# Patient Record
Sex: MALE | Race: WHITE | Employment: FULL TIME | URBAN - METROPOLITAN AREA
[De-identification: names, ages, dates, MRNs, and addresses within clinical notes are randomized per-mention and may not be internally consistent; named-entity substitution may affect disease eponyms.]

---

## 2017-01-19 RX ORDER — MOMETASONE 50 UG/1
2 SPRAY, METERED NASAL DAILY
Qty: 3 BOTTLE | Refills: 1 | Status: SHIPPED | OUTPATIENT
Start: 2017-01-19 | End: 2018-05-17

## 2017-03-30 RX ORDER — LORAZEPAM 0.5 MG/1
TABLET ORAL
Qty: 180 TABLET | Refills: 0 | Status: SHIPPED | OUTPATIENT
Start: 2017-03-30 | End: 2017-08-01

## 2017-05-15 ENCOUNTER — OFFICE VISIT (OUTPATIENT)
Dept: INTERNAL MEDICINE CLINIC | Facility: CLINIC | Age: 57
End: 2017-05-15

## 2017-05-15 VITALS
DIASTOLIC BLOOD PRESSURE: 80 MMHG | SYSTOLIC BLOOD PRESSURE: 122 MMHG | BODY MASS INDEX: 29 KG/M2 | RESPIRATION RATE: 16 BRPM | WEIGHT: 199 LBS | OXYGEN SATURATION: 96 % | TEMPERATURE: 98 F | HEART RATE: 79 BPM

## 2017-05-15 DIAGNOSIS — I25.10 CORONARY ARTERY DISEASE INVOLVING NATIVE CORONARY ARTERY OF NATIVE HEART WITHOUT ANGINA PECTORIS: ICD-10-CM

## 2017-05-15 DIAGNOSIS — R73.01 IMPAIRED FASTING GLUCOSE: ICD-10-CM

## 2017-05-15 DIAGNOSIS — M79.672 PAIN OF LEFT HEEL: ICD-10-CM

## 2017-05-15 DIAGNOSIS — E78.00 PURE HYPERCHOLESTEROLEMIA: Primary | ICD-10-CM

## 2017-05-15 PROCEDURE — 99214 OFFICE O/P EST MOD 30 MIN: CPT | Performed by: FAMILY MEDICINE

## 2017-05-15 NOTE — PROGRESS NOTES
HPI:    Patient ID: Yonathan García is a 64year old male. HPI  Yonathan García is a 64year old male.   HPI:   Here for med ck  Overall doing well  Still seeing Dr Krysta Coles for the heart    The palpitations did go away    Taking meds as directed  No issues rashes  RESPIRATORY: denies shortness of breath with exertion  CARDIOVASCULAR: denies chest pain on exertion  GI: denies abdominal pain and denies heartburn  NEURO: denies headaches    EXAM:   /80 mmHg  Pulse 79  Temp(Src) 98.2 °F (36.8 °C) (Oral)  R Allergies:  Erythromycin            Nausea only   PHYSICAL EXAM:   Physical Exam           ASSESSMENT/PLAN:   Pure hypercholesterolemia  (primary encounter diagnosis)  Impaired fasting glucose  Coronary artery disease involving native coronary artery o

## 2017-06-12 RX ORDER — RANITIDINE 150 MG/1
TABLET ORAL
Qty: 180 TABLET | Refills: 1 | Status: SHIPPED | OUTPATIENT
Start: 2017-06-12 | End: 2017-12-10

## 2017-06-12 RX ORDER — METOPROLOL SUCCINATE 50 MG/1
TABLET, EXTENDED RELEASE ORAL
Qty: 90 TABLET | Refills: 1 | Status: SHIPPED | OUTPATIENT
Start: 2017-06-12 | End: 2017-12-10

## 2017-06-28 ENCOUNTER — TELEPHONE (OUTPATIENT)
Dept: INTERNAL MEDICINE CLINIC | Facility: CLINIC | Age: 57
End: 2017-06-28

## 2017-06-28 NOTE — TELEPHONE ENCOUNTER
Patient informed of orders, patient verbalized understanding.  Contact information provided for Dr. Davey Rivas

## 2017-08-01 ENCOUNTER — TELEPHONE (OUTPATIENT)
Dept: INTERNAL MEDICINE CLINIC | Facility: CLINIC | Age: 57
End: 2017-08-01

## 2017-08-01 RX ORDER — PAROXETINE 10 MG/1
TABLET, FILM COATED ORAL
Qty: 90 TABLET | Refills: 1 | Status: SHIPPED | OUTPATIENT
Start: 2017-08-01 | End: 2018-02-12

## 2017-08-01 RX ORDER — LORAZEPAM 0.5 MG/1
TABLET ORAL
Qty: 180 TABLET | Refills: 0 | COMMUNITY
Start: 2017-08-01 | End: 2017-12-27

## 2017-08-21 ENCOUNTER — OFFICE VISIT (OUTPATIENT)
Dept: INTERNAL MEDICINE CLINIC | Facility: CLINIC | Age: 57
End: 2017-08-21

## 2017-08-21 VITALS
OXYGEN SATURATION: 96 % | HEART RATE: 72 BPM | HEIGHT: 70 IN | DIASTOLIC BLOOD PRESSURE: 86 MMHG | BODY MASS INDEX: 29.24 KG/M2 | WEIGHT: 204.25 LBS | TEMPERATURE: 98 F | RESPIRATION RATE: 16 BRPM | SYSTOLIC BLOOD PRESSURE: 138 MMHG

## 2017-08-21 DIAGNOSIS — K13.70 ORAL LESION: Primary | ICD-10-CM

## 2017-08-21 PROCEDURE — 99213 OFFICE O/P EST LOW 20 MIN: CPT | Performed by: FAMILY MEDICINE

## 2017-08-21 NOTE — PROGRESS NOTES
HPI:    Patient ID: Azra Jimenez is a 62year old male.     HPI  5-6 weeks now w Left cheek sore inside     +discomfort    No dental visit recently     Glands may be sore also L side neck    Had thrush 30 yrs ago     No abx lately    No smoker    1 beer a

## 2017-12-13 RX ORDER — RANITIDINE 150 MG/1
TABLET ORAL
Qty: 180 TABLET | Refills: 1 | Status: SHIPPED | OUTPATIENT
Start: 2017-12-13 | End: 2017-12-27

## 2017-12-13 RX ORDER — METOPROLOL SUCCINATE 50 MG/1
TABLET, EXTENDED RELEASE ORAL
Qty: 90 TABLET | Refills: 0 | Status: SHIPPED | OUTPATIENT
Start: 2017-12-13 | End: 2018-03-05

## 2017-12-27 ENCOUNTER — OFFICE VISIT (OUTPATIENT)
Dept: INTERNAL MEDICINE CLINIC | Facility: CLINIC | Age: 57
End: 2017-12-27

## 2017-12-27 VITALS
TEMPERATURE: 98 F | RESPIRATION RATE: 16 BRPM | HEART RATE: 65 BPM | WEIGHT: 197 LBS | OXYGEN SATURATION: 99 % | DIASTOLIC BLOOD PRESSURE: 84 MMHG | SYSTOLIC BLOOD PRESSURE: 110 MMHG | BODY MASS INDEX: 28 KG/M2

## 2017-12-27 DIAGNOSIS — M25.511 ACUTE PAIN OF RIGHT SHOULDER: Primary | ICD-10-CM

## 2017-12-27 PROCEDURE — 99213 OFFICE O/P EST LOW 20 MIN: CPT | Performed by: FAMILY MEDICINE

## 2017-12-27 RX ORDER — LORAZEPAM 0.5 MG/1
TABLET ORAL
Qty: 180 TABLET | Refills: 0 | Status: SHIPPED | OUTPATIENT
Start: 2017-12-27 | End: 2018-08-29

## 2017-12-27 RX ORDER — MELOXICAM 15 MG/1
15 TABLET ORAL DAILY
Qty: 30 TABLET | Refills: 0 | Status: SHIPPED | OUTPATIENT
Start: 2017-12-27 | End: 2018-06-04 | Stop reason: ALTCHOICE

## 2017-12-27 NOTE — PROGRESS NOTES
HPI:    Patient ID: Alexi Howe is a 62year old male.     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

## 2018-01-09 ENCOUNTER — TELEPHONE (OUTPATIENT)
Dept: INTERNAL MEDICINE CLINIC | Facility: CLINIC | Age: 58
End: 2018-01-09

## 2018-01-09 DIAGNOSIS — Z01.818 PRE-OP TESTING: Primary | ICD-10-CM

## 2018-01-11 LAB
ABSOLUTE BASOPHILS: 62 CELLS/UL (ref 0–200)
ABSOLUTE EOSINOPHILS: 347 CELLS/UL (ref 15–500)
ABSOLUTE LYMPHOCYTES: 1786 CELLS/UL (ref 850–3900)
ABSOLUTE MONOCYTES: 570 CELLS/UL (ref 200–950)
ABSOLUTE NEUTROPHILS: 3435 CELLS/UL (ref 1500–7800)
BASOPHILS: 1 %
EOSINOPHILS: 5.6 %
HEMATOCRIT: 47.7 % (ref 38.5–50)
HEMOGLOBIN: 16.3 G/DL (ref 13.2–17.1)
LYMPHOCYTES: 28.8 %
MCH: 30.7 PG (ref 27–33)
MCHC: 34.2 G/DL (ref 32–36)
MCV: 89.8 FL (ref 80–100)
MONOCYTES: 9.2 %
MPV: 9.6 FL (ref 7.5–12.5)
NEUTROPHILS: 55.4 %
PLATELET COUNT: 211 THOUSAND/UL (ref 140–400)
RDW: 11.9 % (ref 11–15)
RED BLOOD CELL COUNT: 5.31 MILLION/UL (ref 4.2–5.8)
WHITE BLOOD CELL COUNT: 6.2 THOUSAND/UL (ref 3.8–10.8)

## 2018-01-15 ENCOUNTER — OFFICE VISIT (OUTPATIENT)
Dept: INTERNAL MEDICINE CLINIC | Facility: CLINIC | Age: 58
End: 2018-01-15

## 2018-01-15 VITALS
RESPIRATION RATE: 16 BRPM | TEMPERATURE: 98 F | WEIGHT: 205 LBS | DIASTOLIC BLOOD PRESSURE: 78 MMHG | OXYGEN SATURATION: 98 % | HEIGHT: 70 IN | BODY MASS INDEX: 29.35 KG/M2 | HEART RATE: 78 BPM | SYSTOLIC BLOOD PRESSURE: 110 MMHG

## 2018-01-15 DIAGNOSIS — Z01.818 PREOP EXAMINATION: Primary | ICD-10-CM

## 2018-01-15 DIAGNOSIS — M77.32 HEEL SPUR, LEFT: ICD-10-CM

## 2018-01-15 PROCEDURE — 99242 OFF/OP CONSLTJ NEW/EST SF 20: CPT | Performed by: FAMILY MEDICINE

## 2018-01-15 NOTE — PROGRESS NOTES
HPI:    Patient ID: Candi Harrington is a 62year old male. HPI  Candi Harrington is a 62year old male. HPI:   Here for preop exam at the request of Dr Pooja Gamino for his L heel plantar fasciotomy.   He has h/o CAD    Date is 1/22/18 at [address] [facility]   Has appt leyda c Comment: s/p ablation  SVT?  2006, 2008:  ANGIOPLASTY (CORONARY)      Comment: Dr Estefani Serrato     stent  2011 6/2015: COLONOSCOPY      Comment: rachanadiane Fitzpatrick [place]  5/22/2014: Meryle Pont NDL/CATH SPI DX/THER PXJ N/A      Comment: Procedure: Freddie Ibrahim / [place] nourished,in no apparent distress  SKIN: no rashes  HEENT: atraumatic, normocephalic,ears and throat are clear  NECK: supple,no adenopathy,  LUNGS: clear to auscultation  CARDIO: RRR without murmur  GI: good BS's,no masses, HSM or tenderness  EXTREMITIES:

## 2018-02-12 RX ORDER — PAROXETINE 10 MG/1
TABLET, FILM COATED ORAL
Qty: 90 TABLET | Refills: 1 | Status: SHIPPED | OUTPATIENT
Start: 2018-02-12 | End: 2018-06-04

## 2018-03-07 RX ORDER — METOPROLOL SUCCINATE 50 MG/1
TABLET, EXTENDED RELEASE ORAL
Qty: 90 TABLET | Refills: 0 | Status: SHIPPED | OUTPATIENT
Start: 2018-03-07 | End: 2018-06-04

## 2018-05-17 RX ORDER — MOMETASONE 50 UG/1
SPRAY, METERED NASAL
Qty: 1 BOTTLE | Refills: 2 | Status: SHIPPED | OUTPATIENT
Start: 2018-05-17 | End: 2018-06-04

## 2018-06-04 ENCOUNTER — OFFICE VISIT (OUTPATIENT)
Dept: INTERNAL MEDICINE CLINIC | Facility: CLINIC | Age: 58
End: 2018-06-04

## 2018-06-04 VITALS
WEIGHT: 204 LBS | OXYGEN SATURATION: 98 % | HEART RATE: 69 BPM | SYSTOLIC BLOOD PRESSURE: 138 MMHG | DIASTOLIC BLOOD PRESSURE: 80 MMHG | RESPIRATION RATE: 12 BRPM | BODY MASS INDEX: 29.2 KG/M2 | HEIGHT: 70 IN | TEMPERATURE: 98 F

## 2018-06-04 DIAGNOSIS — F41.9 ANXIETY: ICD-10-CM

## 2018-06-04 DIAGNOSIS — I25.10 CORONARY ARTERY DISEASE INVOLVING NATIVE CORONARY ARTERY OF NATIVE HEART WITHOUT ANGINA PECTORIS: ICD-10-CM

## 2018-06-04 DIAGNOSIS — M25.562 PAIN IN BOTH KNEES, UNSPECIFIED CHRONICITY: ICD-10-CM

## 2018-06-04 DIAGNOSIS — M25.561 PAIN IN BOTH KNEES, UNSPECIFIED CHRONICITY: ICD-10-CM

## 2018-06-04 DIAGNOSIS — R73.01 IMPAIRED FASTING GLUCOSE: ICD-10-CM

## 2018-06-04 DIAGNOSIS — E78.00 PURE HYPERCHOLESTEROLEMIA: Primary | ICD-10-CM

## 2018-06-04 PROCEDURE — 99214 OFFICE O/P EST MOD 30 MIN: CPT | Performed by: FAMILY MEDICINE

## 2018-06-04 RX ORDER — MOMETASONE 50 UG/1
SPRAY, METERED NASAL
Qty: 1 BOTTLE | Refills: 0 | Status: SHIPPED | OUTPATIENT
Start: 2018-06-04 | End: 2019-03-06

## 2018-06-04 RX ORDER — PAROXETINE 10 MG/1
TABLET, FILM COATED ORAL
Qty: 90 TABLET | Refills: 1 | Status: SHIPPED | OUTPATIENT
Start: 2018-06-04 | End: 2018-11-26

## 2018-06-04 RX ORDER — RANITIDINE 150 MG/1
150 TABLET ORAL 2 TIMES DAILY
Qty: 180 TABLET | Refills: 0 | Status: SHIPPED | OUTPATIENT
Start: 2018-06-04 | End: 2018-09-09

## 2018-06-04 RX ORDER — METOPROLOL SUCCINATE 50 MG/1
50 TABLET, EXTENDED RELEASE ORAL
Qty: 90 TABLET | Refills: 0 | Status: SHIPPED | OUTPATIENT
Start: 2018-06-04 | End: 2018-09-02

## 2018-06-04 NOTE — PROGRESS NOTES
HPI:    Patient ID: Idalmis Lopez is a 62year old male. HPI  Idalmis Lopez is a 62year old male.   HPI:   Here for med ck  Still seeing Dr Beth Tomlinson of [facility]     On meds   Had labs recently that were fine    Does have knee aches occ   No meds us (Oral)   Resp 12   Ht 70\"   Wt 204 lb   SpO2 98%   BMI 29.27 kg/m²   GENERAL: well developed, well nourished,in no apparent distress  SKIN: no rashes,  NECK: supple,no adenopathy  LUNGS: clear to auscultation  CARDIO: RRR without murmur  EXTREMITIES: no c pectoris  Impaired fasting glucose    No orders of the defined types were placed in this encounter.       Meds This Visit:  Pending Prescriptions Disp Refills    RaNITidine HCl 150 MG Oral Tab 180 tablet 0     Sig: Take 1 tablet (150 mg total) by mouth 2 (t

## 2018-08-30 RX ORDER — LORAZEPAM 0.5 MG/1
TABLET ORAL
Qty: 180 TABLET | Refills: 0 | Status: SHIPPED | OUTPATIENT
Start: 2018-08-30 | End: 2018-11-26

## 2018-08-30 NOTE — TELEPHONE ENCOUNTER
Lorazepam 0.5 mg 1 tab bid prn filled 12/27/17 180 with 0 refills     LOv 6/4/18     return in 6mo.     Labs 1/10/18

## 2018-09-04 RX ORDER — METOPROLOL SUCCINATE 50 MG/1
TABLET, EXTENDED RELEASE ORAL
Qty: 90 TABLET | Refills: 0 | Status: SHIPPED | OUTPATIENT
Start: 2018-09-04 | End: 2018-11-26

## 2018-09-10 RX ORDER — RANITIDINE 150 MG/1
TABLET ORAL
Qty: 180 TABLET | Refills: 0 | Status: SHIPPED | OUTPATIENT
Start: 2018-09-10 | End: 2018-12-13

## 2018-11-26 ENCOUNTER — OFFICE VISIT (OUTPATIENT)
Dept: INTERNAL MEDICINE CLINIC | Facility: CLINIC | Age: 58
End: 2018-11-26
Payer: COMMERCIAL

## 2018-11-26 VITALS
HEART RATE: 74 BPM | SYSTOLIC BLOOD PRESSURE: 140 MMHG | TEMPERATURE: 98 F | BODY MASS INDEX: 28.77 KG/M2 | RESPIRATION RATE: 16 BRPM | HEIGHT: 70 IN | DIASTOLIC BLOOD PRESSURE: 86 MMHG | OXYGEN SATURATION: 97 % | WEIGHT: 201 LBS

## 2018-11-26 DIAGNOSIS — I25.10 CORONARY ARTERY DISEASE INVOLVING NATIVE CORONARY ARTERY OF NATIVE HEART WITHOUT ANGINA PECTORIS: Primary | ICD-10-CM

## 2018-11-26 DIAGNOSIS — F41.9 ANXIETY: ICD-10-CM

## 2018-11-26 DIAGNOSIS — R73.01 IMPAIRED FASTING GLUCOSE: ICD-10-CM

## 2018-11-26 DIAGNOSIS — E78.00 PURE HYPERCHOLESTEROLEMIA: ICD-10-CM

## 2018-11-26 PROCEDURE — 99214 OFFICE O/P EST MOD 30 MIN: CPT | Performed by: FAMILY MEDICINE

## 2018-11-26 RX ORDER — PAROXETINE 10 MG/1
TABLET, FILM COATED ORAL
Qty: 90 TABLET | Refills: 1 | Status: SHIPPED | OUTPATIENT
Start: 2018-11-26 | End: 2019-10-07

## 2018-11-26 RX ORDER — LORAZEPAM 0.5 MG/1
TABLET ORAL
Qty: 180 TABLET | Refills: 0 | Status: SHIPPED | OUTPATIENT
Start: 2018-11-26 | End: 2019-03-31

## 2018-11-26 RX ORDER — METOPROLOL SUCCINATE 50 MG/1
50 TABLET, EXTENDED RELEASE ORAL
Qty: 90 TABLET | Refills: 0 | Status: SHIPPED | OUTPATIENT
Start: 2018-11-26 | End: 2019-03-06

## 2018-11-26 NOTE — PROGRESS NOTES
HPI:    Patient ID: Mitchell Perkins is a 62year old male. HPI  Mitchell Perkins is a 62year old male.   HPI:   Here for med ck   No BP cks   Heart is OK   Does get low energy like w med at times so usees it at end of the day      Had some labs at work I t °C) (Oral)   Resp 16   Ht 70\"   Wt 201 lb   SpO2 97%   BMI 28.84 kg/m²   GENERAL: well developed, well nourished,in no apparent distress  SKIN: no rashes  NECK: supple,no adenopathy  LUNGS: clear to auscultation  CARDIO: RRR without murmur  EXTREMITIES: n Pending Prescriptions Disp Refills   • LORazepam 0.5 MG Oral Tab 180 tablet 0     Sig: TAKE 1 TABLET BY MOUTH TWICE A DAY AS NEEDED FOR ANXIETY   • Metoprolol Succinate ER 50 MG Oral Tablet 24 Hr 90 tablet 0     Sig: Take 1 tablet (50 mg total) by mout

## 2018-12-14 RX ORDER — RANITIDINE 150 MG/1
TABLET ORAL
Qty: 180 TABLET | Refills: 0 | Status: SHIPPED | OUTPATIENT
Start: 2018-12-14 | End: 2019-03-15

## 2018-12-31 PROBLEM — M22.2X2 PATELLOFEMORAL PAIN SYNDROME OF BOTH KNEES: Status: ACTIVE | Noted: 2018-12-31

## 2018-12-31 PROBLEM — M22.2X1 PATELLOFEMORAL PAIN SYNDROME OF BOTH KNEES: Status: ACTIVE | Noted: 2018-12-31

## 2019-01-08 PROBLEM — M25.561 CHRONIC PAIN OF BOTH KNEES: Status: ACTIVE | Noted: 2019-01-08

## 2019-01-08 PROBLEM — M25.562 CHRONIC PAIN OF BOTH KNEES: Status: ACTIVE | Noted: 2019-01-08

## 2019-01-08 PROBLEM — G89.29 CHRONIC PAIN OF BOTH KNEES: Status: ACTIVE | Noted: 2019-01-08

## 2019-02-08 PROBLEM — M17.0 PRIMARY OSTEOARTHRITIS OF BOTH KNEES: Status: ACTIVE | Noted: 2019-02-08

## 2019-03-06 ENCOUNTER — OFFICE VISIT (OUTPATIENT)
Dept: INTERNAL MEDICINE CLINIC | Facility: CLINIC | Age: 59
End: 2019-03-06
Payer: COMMERCIAL

## 2019-03-06 VITALS
SYSTOLIC BLOOD PRESSURE: 134 MMHG | WEIGHT: 197.75 LBS | HEART RATE: 89 BPM | DIASTOLIC BLOOD PRESSURE: 84 MMHG | TEMPERATURE: 99 F | OXYGEN SATURATION: 98 % | HEIGHT: 70 IN | BODY MASS INDEX: 28.31 KG/M2 | RESPIRATION RATE: 16 BRPM

## 2019-03-06 DIAGNOSIS — Z00.00 WELLNESS EXAMINATION: Primary | ICD-10-CM

## 2019-03-06 DIAGNOSIS — Z00.00 LABORATORY EXAM ORDERED AS PART OF ROUTINE GENERAL MEDICAL EXAMINATION: ICD-10-CM

## 2019-03-06 PROCEDURE — 99396 PREV VISIT EST AGE 40-64: CPT | Performed by: FAMILY MEDICINE

## 2019-03-06 RX ORDER — METOPROLOL SUCCINATE 100 MG/1
100 TABLET, EXTENDED RELEASE ORAL
Qty: 90 TABLET | Refills: 1 | Status: SHIPPED | OUTPATIENT
Start: 2019-03-06 | End: 2019-09-16

## 2019-03-06 RX ORDER — MOMETASONE 50 UG/1
SPRAY, METERED NASAL
Qty: 3 BOTTLE | Refills: 3 | Status: SHIPPED | OUTPATIENT
Start: 2019-03-06 | End: 2019-09-16

## 2019-03-06 NOTE — PROGRESS NOTES
HPI:    Patient ID: Anai Angulo is a 62year old male.     HPI     Anai Angulo is a 62year old male who presents for a complete physical exam.   HPI:       Wt Readings from Last 6 Encounters:  03/06/19 : 197 lb 12 oz  02/08/19 : 200 lb  11/26/18 : 20 • ABLATION      s/p ablation  SVT?    • ANGIOPLASTY (CORONARY)  2006, 2008    Dr Toney Chakraborty     stent   • COLONOSCOPY  2011 6/2015    rachana 5    Saray Duarte   • FACET INJECTION UNDER FLUOROSCOPY N/A 6/19/2014    Performed by Clifford Coon MD at [address] [place] [address] Dayron Bourne is a 62year old male who presents for a complete physical exam. Pt's weight is Body mass index is 28.37 kg/m². , recommended low fat diet and aerobic exercise 30 minutes three times weekly.  Health maintenance, will check fasting HCV PSA , CBC and U

## 2019-03-07 LAB
ABSOLUTE BASOPHILS: 52 CELLS/UL (ref 0–200)
ABSOLUTE EOSINOPHILS: 169 CELLS/UL (ref 15–500)
ABSOLUTE LYMPHOCYTES: 1521 CELLS/UL (ref 850–3900)
ABSOLUTE MONOCYTES: 709 CELLS/UL (ref 200–950)
ABSOLUTE NEUTROPHILS: 4050 CELLS/UL (ref 1500–7800)
APPEARANCE: CLEAR
BASOPHILS: 0.8 %
BILIRUBIN: NEGATIVE
COLOR: YELLOW
EOSINOPHILS: 2.6 %
GLUCOSE: NEGATIVE
HEMATOCRIT: 46 % (ref 38.5–50)
HEMOGLOBIN: 16.1 G/DL (ref 13.2–17.1)
KETONES: NEGATIVE
LEUKOCYTE ESTERASE: NEGATIVE
LYMPHOCYTES: 23.4 %
MCH: 31.5 PG (ref 27–33)
MCHC: 35 G/DL (ref 32–36)
MCV: 90 FL (ref 80–100)
MONOCYTES: 10.9 %
MPV: 9.5 FL (ref 7.5–12.5)
NEUTROPHILS: 62.3 %
NITRITE: NEGATIVE
OCCULT BLOOD: NEGATIVE
PH: 7.5 (ref 5–8)
PLATELET COUNT: 204 THOUSAND/UL (ref 140–400)
PROTEIN: NEGATIVE
RDW: 12.1 % (ref 11–15)
RED BLOOD CELL COUNT: 5.11 MILLION/UL (ref 4.2–5.8)
SIGNAL TO CUT-OFF: 0.02
SPECIFIC GRAVITY: 1.02 (ref 1–1.03)
TOTAL PSA: 0.8 NG/ML
WHITE BLOOD CELL COUNT: 6.5 THOUSAND/UL (ref 3.8–10.8)

## 2019-03-15 RX ORDER — RANITIDINE 150 MG/1
TABLET ORAL
Qty: 180 TABLET | Refills: 0 | Status: SHIPPED | OUTPATIENT
Start: 2019-03-15 | End: 2019-06-16

## 2019-03-15 NOTE — TELEPHONE ENCOUNTER
Ranitidine   Last OV relevant to medication: 3-6-19  Last refill date: 12-14-18   #/refills: 0  When pt was asked to return for OV: 1 yr CPX  Upcoming appt/reason: none  Recent labs: 3-6-19: CBC/ HCV/ UA/ PSA

## 2019-04-01 RX ORDER — LORAZEPAM 0.5 MG/1
TABLET ORAL
Qty: 180 TABLET | Refills: 0 | Status: SHIPPED | OUTPATIENT
Start: 2019-04-01 | End: 2019-08-02

## 2019-06-04 PROBLEM — S83.241D ACUTE MEDIAL MENISCAL TEAR, RIGHT, SUBSEQUENT ENCOUNTER: Status: ACTIVE | Noted: 2019-06-04

## 2019-06-04 PROBLEM — S83.242D ACUTE MEDIAL MENISCAL TEAR, LEFT, SUBSEQUENT ENCOUNTER: Status: ACTIVE | Noted: 2019-06-04

## 2019-06-17 RX ORDER — RANITIDINE 150 MG/1
TABLET ORAL
Qty: 180 TABLET | Refills: 0 | Status: SHIPPED | OUTPATIENT
Start: 2019-06-17 | End: 2019-09-16

## 2019-06-25 PROBLEM — Z98.890 S/P LEFT KNEE ARTHROSCOPY: Status: ACTIVE | Noted: 2019-06-25

## 2019-08-05 ENCOUNTER — TELEPHONE (OUTPATIENT)
Dept: INTERNAL MEDICINE CLINIC | Facility: CLINIC | Age: 59
End: 2019-08-05

## 2019-08-05 RX ORDER — LORAZEPAM 0.5 MG/1
TABLET ORAL
Qty: 180 TABLET | Refills: 0 | OUTPATIENT
Start: 2019-08-05

## 2019-08-05 RX ORDER — LORAZEPAM 0.5 MG/1
TABLET ORAL
Qty: 60 TABLET | Refills: 0 | Status: SHIPPED | OUTPATIENT
Start: 2019-08-05 | End: 2019-09-16

## 2019-09-16 ENCOUNTER — OFFICE VISIT (OUTPATIENT)
Dept: INTERNAL MEDICINE CLINIC | Facility: CLINIC | Age: 59
End: 2019-09-16
Payer: COMMERCIAL

## 2019-09-16 VITALS
BODY MASS INDEX: 29.03 KG/M2 | HEIGHT: 70 IN | WEIGHT: 202.75 LBS | SYSTOLIC BLOOD PRESSURE: 135 MMHG | DIASTOLIC BLOOD PRESSURE: 80 MMHG | RESPIRATION RATE: 16 BRPM | TEMPERATURE: 98 F | HEART RATE: 71 BPM | OXYGEN SATURATION: 98 %

## 2019-09-16 DIAGNOSIS — I25.10 CORONARY ARTERY DISEASE INVOLVING NATIVE CORONARY ARTERY OF NATIVE HEART WITHOUT ANGINA PECTORIS: Primary | ICD-10-CM

## 2019-09-16 DIAGNOSIS — E78.00 PURE HYPERCHOLESTEROLEMIA: ICD-10-CM

## 2019-09-16 DIAGNOSIS — F41.9 ANXIETY: ICD-10-CM

## 2019-09-16 DIAGNOSIS — R73.01 IMPAIRED FASTING GLUCOSE: ICD-10-CM

## 2019-09-16 PROCEDURE — 99214 OFFICE O/P EST MOD 30 MIN: CPT | Performed by: FAMILY MEDICINE

## 2019-09-16 RX ORDER — METOPROLOL SUCCINATE 100 MG/1
100 TABLET, EXTENDED RELEASE ORAL
Qty: 90 TABLET | Refills: 1 | Status: SHIPPED | OUTPATIENT
Start: 2019-09-16

## 2019-09-16 RX ORDER — RANITIDINE 150 MG/1
150 TABLET ORAL 2 TIMES DAILY
Qty: 180 TABLET | Refills: 1 | Status: SHIPPED | OUTPATIENT
Start: 2019-09-16

## 2019-09-16 RX ORDER — MAGNESIUM OXIDE 400 MG (241.3 MG MAGNESIUM) TABLET
1 TABLET NIGHTLY
COMMUNITY

## 2019-09-16 RX ORDER — MOMETASONE 50 UG/1
SPRAY, METERED NASAL
Qty: 3 BOTTLE | Refills: 3 | Status: SHIPPED | OUTPATIENT
Start: 2019-09-16

## 2019-09-16 RX ORDER — LORAZEPAM 0.5 MG/1
TABLET ORAL
Qty: 60 TABLET | Refills: 0 | Status: SHIPPED | OUTPATIENT
Start: 2019-09-16 | End: 2019-11-04

## 2019-09-16 NOTE — PROGRESS NOTES
HPI:    Patient ID: Yomi Pinedo is a 61year old male. HPI  Yomi Pinedo is a 61year old male. HPI:   Pt is here for med ck/follow up. Overall is doing well. Is taking meds as directed.   No issues w medications      Did see cardiology in June Yes      Alcohol/week: 0.0 standard drinks      Frequency: 2-4 times a month    Drug use: No       REVIEW OF SYSTEMS:   GENERAL HEALTH: feels well otherwise  SKIN: denies rashes  RESPIRATORY: denies shortness of breath   CARDIOVASCULAR: denies chest pain o tablet Rfl: 1   aspirin 81 MG Oral Tab Take 81 mg by mouth daily. Disp:  Rfl:    simvastatin (ZOCOR) 80 MG Oral Tab Take 80 mg by mouth nightly. Disp:  Rfl:    Multiple Vitamin (MULTIVITAMINS OR) Take  by mouth.  Disp:  Rfl:      Allergies:  Erythromycin

## 2019-09-18 NOTE — TELEPHONE ENCOUNTER
Patient called in regarding his refill of Lorazepam.  Stated that he came in for his appointment 19, however Dr. Joe  only gave him temporary refill of 60 tablet. Patient is requestin day supply of Lorazepam 0.5 MG Oral Tab.      Patient is

## 2019-09-20 ENCOUNTER — TELEPHONE (OUTPATIENT)
Dept: INTERNAL MEDICINE CLINIC | Facility: CLINIC | Age: 59
End: 2019-09-20

## 2019-09-20 NOTE — TELEPHONE ENCOUNTER
LORazepam 0.5 MG pharmacy called to get a 90 day supply   [id number] [address], [address] [facility] [place] [address] [place] [address], [phone], [phone]

## 2019-09-25 NOTE — TELEPHONE ENCOUNTER
Patient called in for update on status of 90 day supply of Lorazepam.   Per Dr. Pearl Diaz, informed patient that he is to stay on 30 day supply from now on to monitor how patient is doing more frequently. Pt verbalized understanding.

## 2019-10-08 RX ORDER — PAROXETINE 10 MG/1
TABLET, FILM COATED ORAL
Qty: 90 TABLET | Refills: 1 | Status: SHIPPED | OUTPATIENT
Start: 2019-10-08

## 2019-11-05 RX ORDER — LORAZEPAM 0.5 MG/1
TABLET ORAL
Qty: 60 TABLET | Refills: 0 | Status: SHIPPED | OUTPATIENT
Start: 2019-11-05 | End: 2019-12-02

## 2019-11-05 NOTE — TELEPHONE ENCOUNTER
LORAZEPAM 0.5 MG Oral Tab    Last OV relevant to medication: 3/6/2019  Last refill date: 9/16/2019     #/refills: #60 w/ 0 refills  When pt was asked to return for OV: 1 year   Upcoming appt/reason: no future appointments

## 2019-12-03 NOTE — TELEPHONE ENCOUNTER
Lorazepam 0.5 mg Oral Tab    Last OV relevant to medication: 9/16/2019  Last refill date: 11/5/2019     #/refills: #60 w/ 0 refills   When pt was asked to return for OV: 6 months   Upcoming appt/reason: no future appointments

## 2019-12-04 RX ORDER — LORAZEPAM 0.5 MG/1
TABLET ORAL
Qty: 60 TABLET | Refills: 0 | Status: SHIPPED | OUTPATIENT
Start: 2019-12-04 | End: 2020-01-11

## 2020-01-11 RX ORDER — LORAZEPAM 0.5 MG/1
TABLET ORAL
Qty: 60 TABLET | Refills: 0 | Status: SHIPPED | OUTPATIENT
Start: 2020-01-11

## 2020-01-11 NOTE — TELEPHONE ENCOUNTER
Lorazepam 0.5 mg Oral Tab    Last OV relevant to medication: 9/16/2019    Last refill date: 12/4/2019     #/refills: #60 w/ 0 refills     When pt was asked to return for OV: 6 months     Upcoming appt/reason: 3/19/2020

## 2020-02-10 ENCOUNTER — TELEPHONE (OUTPATIENT)
Dept: INTERNAL MEDICINE CLINIC | Facility: CLINIC | Age: 60
End: 2020-02-10

## 2020-02-11 RX ORDER — LORAZEPAM 0.5 MG/1
TABLET ORAL
Qty: 60 TABLET | Refills: 0 | OUTPATIENT
Start: 2020-02-11

## 2020-02-11 NOTE — TELEPHONE ENCOUNTER
Patient calling in, asking the refusal reasoning. Please state reason below please and contact patient.      Patient has an upcoming appointment on 03/18/2020

## 2021-04-27 ENCOUNTER — LAB ENCOUNTER (OUTPATIENT)
Dept: LAB | Age: 61
End: 2021-04-27
Attending: INTERNAL MEDICINE
Payer: COMMERCIAL

## 2021-04-27 DIAGNOSIS — Z01.818 PRE-OP TESTING: ICD-10-CM

## 2021-04-30 PROBLEM — D12.3 BENIGN NEOPLASM OF TRANSVERSE COLON: Status: ACTIVE | Noted: 2021-04-30

## 2021-04-30 PROBLEM — Z86.010 HISTORY OF ADENOMATOUS POLYP OF COLON: Status: ACTIVE | Noted: 2021-04-30

## 2021-04-30 PROBLEM — D12.0 BENIGN NEOPLASM OF CECUM: Status: ACTIVE | Noted: 2021-04-30

## 2021-06-21 NOTE — MR AVS SNAPSHOT
[place]  [address] [address]  [address] [facility] [address] [phone]               Thank you for choosing us for your health care visit with Jessica Cary MD.  We are glad to serve you and happy to provide you with this sum normal mood with appropriate affect Commonly known as:  ZANTAC           simvastatin 80 MG Tabs   Take 80 mg by mouth nightly.    Commonly known as:  Maricarmen Zepeda                  Visit [facility] online at  [email or web address]